# Patient Record
Sex: MALE | Race: WHITE | NOT HISPANIC OR LATINO | Employment: FULL TIME | URBAN - METROPOLITAN AREA
[De-identification: names, ages, dates, MRNs, and addresses within clinical notes are randomized per-mention and may not be internally consistent; named-entity substitution may affect disease eponyms.]

---

## 2023-07-13 ENCOUNTER — HOSPITAL ENCOUNTER (EMERGENCY)
Facility: CLINIC | Age: 43
Discharge: ANOTHER HOSPITAL | End: 2023-07-13
Attending: EMERGENCY MEDICINE | Admitting: EMERGENCY MEDICINE
Payer: COMMERCIAL

## 2023-07-13 ENCOUNTER — APPOINTMENT (OUTPATIENT)
Dept: CT IMAGING | Facility: CLINIC | Age: 43
End: 2023-07-13
Attending: EMERGENCY MEDICINE
Payer: COMMERCIAL

## 2023-07-13 VITALS
HEART RATE: 91 BPM | WEIGHT: 175 LBS | SYSTOLIC BLOOD PRESSURE: 99 MMHG | OXYGEN SATURATION: 93 % | TEMPERATURE: 99.7 F | DIASTOLIC BLOOD PRESSURE: 63 MMHG | RESPIRATION RATE: 20 BRPM

## 2023-07-13 DIAGNOSIS — K57.20 DIVERTICULITIS OF COLON WITH PERFORATION: ICD-10-CM

## 2023-07-13 LAB
ALBUMIN SERPL BCG-MCNC: 4 G/DL (ref 3.5–5.2)
ALP SERPL-CCNC: 54 U/L (ref 40–129)
ALT SERPL W P-5'-P-CCNC: 17 U/L (ref 0–70)
ANION GAP SERPL CALCULATED.3IONS-SCNC: 12 MMOL/L (ref 7–15)
AST SERPL W P-5'-P-CCNC: 18 U/L (ref 0–45)
BASOPHILS # BLD AUTO: 0 10E3/UL (ref 0–0.2)
BASOPHILS NFR BLD AUTO: 0 %
BILIRUB SERPL-MCNC: 0.4 MG/DL
BUN SERPL-MCNC: 18.6 MG/DL (ref 6–20)
CALCIUM SERPL-MCNC: 9.4 MG/DL (ref 8.6–10)
CHLORIDE SERPL-SCNC: 102 MMOL/L (ref 98–107)
CREAT SERPL-MCNC: 0.69 MG/DL (ref 0.67–1.17)
DEPRECATED HCO3 PLAS-SCNC: 24 MMOL/L (ref 22–29)
EOSINOPHIL # BLD AUTO: 0 10E3/UL (ref 0–0.7)
EOSINOPHIL NFR BLD AUTO: 0 %
ERYTHROCYTE [DISTWIDTH] IN BLOOD BY AUTOMATED COUNT: 11.9 % (ref 10–15)
GFR SERPL CREATININE-BSD FRML MDRD: >90 ML/MIN/1.73M2
GLUCOSE SERPL-MCNC: 125 MG/DL (ref 70–99)
HCT VFR BLD AUTO: 44.8 % (ref 40–53)
HGB BLD-MCNC: 15.4 G/DL (ref 13.3–17.7)
IMM GRANULOCYTES # BLD: 0 10E3/UL
IMM GRANULOCYTES NFR BLD: 0 %
LIPASE SERPL-CCNC: 17 U/L (ref 13–60)
LYMPHOCYTES # BLD AUTO: 0.2 10E3/UL (ref 0.8–5.3)
LYMPHOCYTES NFR BLD AUTO: 3 %
MCH RBC QN AUTO: 33.8 PG (ref 26.5–33)
MCHC RBC AUTO-ENTMCNC: 34.4 G/DL (ref 31.5–36.5)
MCV RBC AUTO: 99 FL (ref 78–100)
MONOCYTES # BLD AUTO: 0.3 10E3/UL (ref 0–1.3)
MONOCYTES NFR BLD AUTO: 4 %
NEUTROPHILS # BLD AUTO: 6.2 10E3/UL (ref 1.6–8.3)
NEUTROPHILS NFR BLD AUTO: 93 %
NRBC # BLD AUTO: 0 10E3/UL
NRBC BLD AUTO-RTO: 0 /100
PLATELET # BLD AUTO: 236 10E3/UL (ref 150–450)
POTASSIUM SERPL-SCNC: 4.5 MMOL/L (ref 3.4–5.3)
PROT SERPL-MCNC: 6.9 G/DL (ref 6.4–8.3)
RBC # BLD AUTO: 4.55 10E6/UL (ref 4.4–5.9)
SODIUM SERPL-SCNC: 138 MMOL/L (ref 136–145)
WBC # BLD AUTO: 6.8 10E3/UL (ref 4–11)

## 2023-07-13 PROCEDURE — 36415 COLL VENOUS BLD VENIPUNCTURE: CPT | Performed by: EMERGENCY MEDICINE

## 2023-07-13 PROCEDURE — 96375 TX/PRO/DX INJ NEW DRUG ADDON: CPT | Performed by: EMERGENCY MEDICINE

## 2023-07-13 PROCEDURE — 96365 THER/PROPH/DIAG IV INF INIT: CPT | Mod: 59 | Performed by: EMERGENCY MEDICINE

## 2023-07-13 PROCEDURE — 99285 EMERGENCY DEPT VISIT HI MDM: CPT | Mod: 25 | Performed by: EMERGENCY MEDICINE

## 2023-07-13 PROCEDURE — 96376 TX/PRO/DX INJ SAME DRUG ADON: CPT | Performed by: EMERGENCY MEDICINE

## 2023-07-13 PROCEDURE — 250N000011 HC RX IP 250 OP 636: Performed by: EMERGENCY MEDICINE

## 2023-07-13 PROCEDURE — 250N000009 HC RX 250: Performed by: EMERGENCY MEDICINE

## 2023-07-13 PROCEDURE — 96361 HYDRATE IV INFUSION ADD-ON: CPT | Performed by: EMERGENCY MEDICINE

## 2023-07-13 PROCEDURE — 74177 CT ABD & PELVIS W/CONTRAST: CPT

## 2023-07-13 PROCEDURE — 80053 COMPREHEN METABOLIC PANEL: CPT | Performed by: EMERGENCY MEDICINE

## 2023-07-13 PROCEDURE — 99285 EMERGENCY DEPT VISIT HI MDM: CPT | Performed by: EMERGENCY MEDICINE

## 2023-07-13 PROCEDURE — 83690 ASSAY OF LIPASE: CPT | Performed by: EMERGENCY MEDICINE

## 2023-07-13 PROCEDURE — 258N000003 HC RX IP 258 OP 636: Performed by: EMERGENCY MEDICINE

## 2023-07-13 PROCEDURE — 85014 HEMATOCRIT: CPT | Performed by: EMERGENCY MEDICINE

## 2023-07-13 RX ORDER — PIPERACILLIN SODIUM, TAZOBACTAM SODIUM 4; .5 G/20ML; G/20ML
4.5 INJECTION, POWDER, LYOPHILIZED, FOR SOLUTION INTRAVENOUS ONCE
Status: COMPLETED | OUTPATIENT
Start: 2023-07-13 | End: 2023-07-13

## 2023-07-13 RX ORDER — TESTOSTERONE ENANTHATE 200 MG/ML
INJECTION, SOLUTION INTRAMUSCULAR
COMMUNITY

## 2023-07-13 RX ORDER — MULTIVIT,TX WITH IRON,MINERALS
500 TABLET, EXTENDED RELEASE ORAL 2 TIMES DAILY
COMMUNITY

## 2023-07-13 RX ORDER — IOPAMIDOL 755 MG/ML
500 INJECTION, SOLUTION INTRAVASCULAR ONCE
Status: COMPLETED | OUTPATIENT
Start: 2023-07-13 | End: 2023-07-13

## 2023-07-13 RX ORDER — ASCORBIC ACID
5000 GRANULES (GRAM) ORAL DAILY
COMMUNITY

## 2023-07-13 RX ORDER — HYDROMORPHONE HYDROCHLORIDE 1 MG/ML
0.5 INJECTION, SOLUTION INTRAMUSCULAR; INTRAVENOUS; SUBCUTANEOUS ONCE
Status: COMPLETED | OUTPATIENT
Start: 2023-07-13 | End: 2023-07-13

## 2023-07-13 RX ORDER — PYRIDOXINE HCL (VITAMIN B6) 100 MG
250 TABLET ORAL DAILY
COMMUNITY

## 2023-07-13 RX ORDER — MULTIPLE VITAMINS W/ MINERALS TAB 9MG-400MCG
1 TAB ORAL DAILY
COMMUNITY

## 2023-07-13 RX ADMIN — HYDROMORPHONE HYDROCHLORIDE 0.5 MG: 1 INJECTION, SOLUTION INTRAMUSCULAR; INTRAVENOUS; SUBCUTANEOUS at 11:39

## 2023-07-13 RX ADMIN — IOPAMIDOL 85 ML: 755 INJECTION, SOLUTION INTRAVENOUS at 10:30

## 2023-07-13 RX ADMIN — HYDROMORPHONE HYDROCHLORIDE 0.5 MG: 1 INJECTION, SOLUTION INTRAMUSCULAR; INTRAVENOUS; SUBCUTANEOUS at 14:47

## 2023-07-13 RX ADMIN — SODIUM CHLORIDE 61 ML: 9 INJECTION, SOLUTION INTRAVENOUS at 10:30

## 2023-07-13 RX ADMIN — PIPERACILLIN AND TAZOBACTAM 4.5 G: 4; .5 INJECTION, POWDER, FOR SOLUTION INTRAVENOUS at 11:41

## 2023-07-13 RX ADMIN — SODIUM CHLORIDE 1000 ML: 9 INJECTION, SOLUTION INTRAVENOUS at 11:42

## 2023-07-13 ASSESSMENT — ACTIVITIES OF DAILY LIVING (ADL)
ADLS_ACUITY_SCORE: 35

## 2023-07-13 NOTE — CONSULTS
Patient seen in consultation for perforated diverticulitis    HPI:  Patient is a 43 year old male with 7 day history of LLQ abdominal pain now more generalized presented to the ED for evaluation. He is a  staying near the [facility] but lives in [place], [place]. His evaluation in the ED showed sigmoid colon inflammation with free intra-peritoneal air. He does not have an abscess. Of note, he is s/p chemo-radiation for rectal cancer completed ~7 months ago. He is currently undergoing surveillance at [facility] where all of his cancer care has been. He has never had abdominal surgery. Vital signs are WNL but his heart rate is upper normal and his temperature is upper normal.     Review Of Systems    Skin: negative  Ears/Nose/Throat: negative  Respiratory: No shortness of breath, dyspnea on exertion, cough, or hemoptysis  Cardiovascular: negative  Gastrointestinal: as above  Genitourinary: negative  Musculoskeletal: negative  Neurologic: negative  Hematologic/Lymphatic/Immunologic: negative  Endocrine: negative      Past Medical History:   Diagnosis Date     Rectal cancer (H)        No past surgical history on file.    No family history on file.    Social History     Socioeconomic History     Marital status:      Spouse name: Not on file     Number of children: Not on file     Years of education: Not on file     Highest education level: Not on file   Occupational History     Not on file   Tobacco Use     Smoking status: Not on file     Smokeless tobacco: Not on file   Substance and Sexual Activity     Alcohol use: Not on file     Drug use: Not on file     Sexual activity: Not on file   Other Topics Concern     Not on file   Social History Narrative     Not on file     Social Determinants of Health     Financial Resource Strain: Not on file   Food Insecurity: Not on file   Transportation Needs: Not on file   Physical Activity: Not on file   Stress: Not on file   Social Connections: Not on file    Intimate Partner Violence: Not on file   Housing Stability: Not on file       No current outpatient medications on file.       Medications and history reviewed    Physical exam:  Vitals: /81   Pulse 95   Temp 100.1  F (37.8  C) (Oral)   Resp 20   Wt 79.4 kg (175 lb)   SpO2 94%   BMI= There is no height or weight on file to calculate BMI.    Constitutional: alert, non-distressed   Head: Normo-cephalic, atraumatic, no lesions, masses or tenderness   Cardiovascular: RRR   Respiratory: CTAB   Gastrointestinal: Soft, no rigidity or guarding but significantly tender to palpation diffusely, small umbilical hernia noted  : Deferred  Musculoskeletal: Moves all extremities   Skin: No suspicious lesions or rashes   Psychiatric: Mentation appears normal, affect appropriate .    Labs show:  Results for orders placed or performed during the hospital encounter of 07/13/23 (from the past 24 hour(s))   CBC with platelets differential    Narrative    The following orders were created for panel order CBC with platelets differential.  Procedure                               Abnormality         Status                     ---------                               -----------         ------                     CBC with platelets and d...[[id number]]  Abnormal            Final result                 Please view results for these tests on the individual orders.   Comprehensive metabolic panel   Result Value Ref Range    Sodium 138 136 - 145 mmol/L    Potassium 4.5 3.4 - 5.3 mmol/L    Chloride 102 98 - 107 mmol/L    Carbon Dioxide (CO2) 24 22 - 29 mmol/L    Anion Gap 12 7 - 15 mmol/L    Urea Nitrogen 18.6 6.0 - 20.0 mg/dL    Creatinine 0.69 0.67 - 1.17 mg/dL    Calcium 9.4 8.6 - 10.0 mg/dL    Glucose 125 (H) 70 - 99 mg/dL    Alkaline Phosphatase 54 40 - 129 U/L    AST 18 0 - 45 U/L    ALT 17 0 - 70 U/L    Protein Total 6.9 6.4 - 8.3 g/dL    Albumin 4.0 3.5 - 5.2 g/dL    Bilirubin Total 0.4 <=1.2 mg/dL    GFR Estimate >90 >60  mL/min/1.73m2   Lipase   Result Value Ref Range    Lipase 17 13 - 60 U/L   CBC with platelets and differential   Result Value Ref Range    WBC Count 6.8 4.0 - 11.0 10e3/uL    RBC Count 4.55 4.40 - 5.90 10e6/uL    Hemoglobin 15.4 13.3 - 17.7 g/dL    Hematocrit 44.8 40.0 - 53.0 %    MCV 99 78 - 100 fL    MCH 33.8 (H) 26.5 - 33.0 pg    MCHC 34.4 31.5 - 36.5 g/dL    RDW 11.9 10.0 - 15.0 %    Platelet Count 236 150 - 450 10e3/uL    % Neutrophils 93 %    % Lymphocytes 3 %    % Monocytes 4 %    % Eosinophils 0 %    % Basophils 0 %    % Immature Granulocytes 0 %    NRBCs per 100 WBC 0 <1 /100    Absolute Neutrophils 6.2 1.6 - 8.3 10e3/uL    Absolute Lymphocytes 0.2 (L) 0.8 - 5.3 10e3/uL    Absolute Monocytes 0.3 0.0 - 1.3 10e3/uL    Absolute Eosinophils 0.0 0.0 - 0.7 10e3/uL    Absolute Basophils 0.0 0.0 - 0.2 10e3/uL    Absolute Immature Granulocytes 0.0 <=0.4 10e3/uL    Absolute NRBCs 0.0 10e3/uL   CT Abdomen Pelvis w Contrast    Narrative    CT ABDOMEN AND PELVIS WITH CONTRAST  7/13/2023 10:42 AM    HISTORY: Left lower quadrant pain, right upper quadrant pain, guarding  of seven days duration.    TECHNIQUE: CT scan obtained of the abdomen, and pelvis with IV  contrast. ISOVUE-370, 85 mL IV injected. Radiation dose for this scan  was reduced using automated exposure control, adjustment of the mA  and/or kV according to patient size, or iterative reconstruction  technique.    COMPARISON: None available    FINDINGS:    Lower chest: Basilar pulmonary opacities, likely atelectasis.    Abdomen/pelvis:    Hepatobiliary: No suspicious focal hepatic lesion. The gallbladder is  unremarkable.    Pancreas: No main pancreatic ductal dilatation or definite solid  pancreatic mass.    Spleen: No splenomegaly.    Adrenal glands: No adrenal nodules.    Kidneys: No radiodense kidney/ureteral stones or hydronephrosis in the  kidney.    Bowel: No abnormally dilated small bowel loops. The appendix is  visualized and appears normal. Colonic  diverticulosis and colonic wall  thickening of the sigmoid colon, findings worrisome for acute  diverticulitis or short segment colitis.    Peritoneum: Foci of free peritoneal air, worrisome for colonic  perforation. Trace amount of free fluid in the pelvis, indeterminate,  could be reactive.    Pelvic organs: Diffuse urinary bladder wall thickening, nonspecific,  can be seen with chronic bladder outlet obstruction or chronic  cystitis.    Vascular: Moderate atherosclerotic vascular calcification of the  abdominal aorta and iliac vessels.    Lymph nodes: No significant abdominopelvic lymphadenopathy.    Bones and soft tissue: No suspicious osseous lesion.      Impression    IMPRESSION:   1. Colonic diverticulosis and colonic wall thickening of the sigmoid  colon, findings worrisome for acute diverticulitis or short segment  colitis. Foci of free peritoneal air, worrisome for colonic  perforation.    PATRICIA KRAMER MD         SYSTEM ID:  [id number]       Imaging shows:  Recent Results (from the past 744 hour(s))   CT Abdomen Pelvis w Contrast    Narrative    CT ABDOMEN AND PELVIS WITH CONTRAST  7/13/2023 10:42 AM    HISTORY: Left lower quadrant pain, right upper quadrant pain, guarding  of seven days duration.    TECHNIQUE: CT scan obtained of the abdomen, and pelvis with IV  contrast. ISOVUE-370, 85 mL IV injected. Radiation dose for this scan  was reduced using automated exposure control, adjustment of the mA  and/or kV according to patient size, or iterative reconstruction  technique.    COMPARISON: None available    FINDINGS:    Lower chest: Basilar pulmonary opacities, likely atelectasis.    Abdomen/pelvis:    Hepatobiliary: No suspicious focal hepatic lesion. The gallbladder is  unremarkable.    Pancreas: No main pancreatic ductal dilatation or definite solid  pancreatic mass.    Spleen: No splenomegaly.    Adrenal glands: No adrenal nodules.    Kidneys: No radiodense kidney/ureteral stones or hydronephrosis  in the  kidney.    Bowel: No abnormally dilated small bowel loops. The appendix is  visualized and appears normal. Colonic diverticulosis and colonic wall  thickening of the sigmoid colon, findings worrisome for acute  diverticulitis or short segment colitis.    Peritoneum: Foci of free peritoneal air, worrisome for colonic  perforation. Trace amount of free fluid in the pelvis, indeterminate,  could be reactive.    Pelvic organs: Diffuse urinary bladder wall thickening, nonspecific,  can be seen with chronic bladder outlet obstruction or chronic  cystitis.    Vascular: Moderate atherosclerotic vascular calcification of the  abdominal aorta and iliac vessels.    Lymph nodes: No significant abdominopelvic lymphadenopathy.    Bones and soft tissue: No suspicious osseous lesion.      Impression    IMPRESSION:   1. Colonic diverticulosis and colonic wall thickening of the sigmoid  colon, findings worrisome for acute diverticulitis or short segment  colitis. Foci of free peritoneal air, worrisome for colonic  perforation.    PATRICIA KRAMER MD         SYSTEM ID:  [id number]        Assessment:     ICD-10-CM    1. Diverticulitis of colon with perforation  K57.20         Plan: Not septic or peritoneal at this time but given personal history of cancer and immunocompromised state this can complicate the picture. Recommend close observation with NPO, IVF resuscitation, IV ABX and symptom control. We discussed need for emergency surgery if he clinically declines with often times leads to temporary colostomy creation. He is very concerned about this and wishes to avoid this if at all possible. He is interested in pursuing transfer to higher level of care with colorectal surgery availability given his personal history of recent rectal cancer. We will call [facility] and [facility] to see if there is any bed availability.    Brooks Tyler, DO

## 2023-07-13 NOTE — ED PROVIDER NOTES
History     chief complaint  HPI  Patient is a 43-year-old gentleman with a history of rectal cancer status post radiation who is presenting with 7 days of worsening left lower quadrant pain.  This morning he had a sudden onset of worsening of pain and it spread to the rest of his abdomen.  Had an episode of nausea this morning.  No vomiting.  Initially had some diarrhea but it turned into constipation and he has not had a bowel movement in several days.  No rhinorrhea, sore throat, cough, fevers, chills, chest pain, dyspnea.    Review of Systems:  All organ systems below were reviewed and are negative unless indicated in the HPI.    Constitutional  Eyes  ENT  Respiratory  Cardiovascular  Gastrointestinal  Genitourinary  Musculoskeletal  Skin  Neuro    Allergies:  No Known Allergies    Problem List:    Patient Active Problem List    Diagnosis Date Noted     Pain in joint, shoulder region 07/01/2008     Priority: Medium        Past Medical History:    Past Medical History:   Diagnosis Date     Rectal cancer (H)        Past Surgical History:    No past surgical history on file.    Family History:    No family history on file.    Medications:    Ascorbic Acid POWD  Cholecalciferol LIQD  magnesium gluconate (MAGONATE) 250 MG tablet  multivitamin w/minerals (THERA-VIT-M) tablet  Selenium 100 MCG TABS  testosterone enanthate (DELATESTRYL) 200 MG/ML injection          Physical Exam   BP: 112/81  Pulse: 95  Temp: 100.1  F (37.8  C)  Resp: 20  Weight: 79.4 kg (175 lb)  SpO2: 93 %    Gen: Vital signs reviewed  Eyes: Sclera white, pupils round  ENT: External ears and nares normal  Card: Regular rate and rhythm  Resp: No respiratory distress. Lungs clear to auscultation bilaterally  Abd: Soft, non-distended, tender to palpation with mild guarding left lower quadrant and less so elsewhere but still present.  Extremities: Symmetric distal pulses.  Skin: No rashes  Neuro: Alert, speech normal.     ED Course         Procedures                Results for orders placed or performed during the hospital encounter of 07/13/23 (from the past 24 hour(s))   CBC with platelets differential    Narrative    The following orders were created for panel order CBC with platelets differential.  Procedure                               Abnormality         Status                     ---------                               -----------         ------                     CBC with platelets and d...[[id number]]  Abnormal            Final result                 Please view results for these tests on the individual orders.   Comprehensive metabolic panel   Result Value Ref Range    Sodium 138 136 - 145 mmol/L    Potassium 4.5 3.4 - 5.3 mmol/L    Chloride 102 98 - 107 mmol/L    Carbon Dioxide (CO2) 24 22 - 29 mmol/L    Anion Gap 12 7 - 15 mmol/L    Urea Nitrogen 18.6 6.0 - 20.0 mg/dL    Creatinine 0.69 0.67 - 1.17 mg/dL    Calcium 9.4 8.6 - 10.0 mg/dL    Glucose 125 (H) 70 - 99 mg/dL    Alkaline Phosphatase 54 40 - 129 U/L    AST 18 0 - 45 U/L    ALT 17 0 - 70 U/L    Protein Total 6.9 6.4 - 8.3 g/dL    Albumin 4.0 3.5 - 5.2 g/dL    Bilirubin Total 0.4 <=1.2 mg/dL    GFR Estimate >90 >60 mL/min/1.73m2   Lipase   Result Value Ref Range    Lipase 17 13 - 60 U/L   CBC with platelets and differential   Result Value Ref Range    WBC Count 6.8 4.0 - 11.0 10e3/uL    RBC Count 4.55 4.40 - 5.90 10e6/uL    Hemoglobin 15.4 13.3 - 17.7 g/dL    Hematocrit 44.8 40.0 - 53.0 %    MCV 99 78 - 100 fL    MCH 33.8 (H) 26.5 - 33.0 pg    MCHC 34.4 31.5 - 36.5 g/dL    RDW 11.9 10.0 - 15.0 %    Platelet Count 236 150 - 450 10e3/uL    % Neutrophils 93 %    % Lymphocytes 3 %    % Monocytes 4 %    % Eosinophils 0 %    % Basophils 0 %    % Immature Granulocytes 0 %    NRBCs per 100 WBC 0 <1 /100    Absolute Neutrophils 6.2 1.6 - 8.3 10e3/uL    Absolute Lymphocytes 0.2 (L) 0.8 - 5.3 10e3/uL    Absolute Monocytes 0.3 0.0 - 1.3 10e3/uL    Absolute Eosinophils 0.0 0.0 - 0.7 10e3/uL     Absolute Basophils 0.0 0.0 - 0.2 10e3/uL    Absolute Immature Granulocytes 0.0 <=0.4 10e3/uL    Absolute NRBCs 0.0 10e3/uL   CT Abdomen Pelvis w Contrast    Narrative    CT ABDOMEN AND PELVIS WITH CONTRAST  7/13/2023 10:42 AM    HISTORY: Left lower quadrant pain, right upper quadrant pain, guarding  of seven days duration.    TECHNIQUE: CT scan obtained of the abdomen, and pelvis with IV  contrast. ISOVUE-370, 85 mL IV injected. Radiation dose for this scan  was reduced using automated exposure control, adjustment of the mA  and/or kV according to patient size, or iterative reconstruction  technique.    COMPARISON: None available    FINDINGS:    Lower chest: Basilar pulmonary opacities, likely atelectasis.    Abdomen/pelvis:    Hepatobiliary: No suspicious focal hepatic lesion. The gallbladder is  unremarkable.    Pancreas: No main pancreatic ductal dilatation or definite solid  pancreatic mass.    Spleen: No splenomegaly.    Adrenal glands: No adrenal nodules.    Kidneys: No radiodense kidney/ureteral stones or hydronephrosis in the  kidney.    Bowel: No abnormally dilated small bowel loops. The appendix is  visualized and appears normal. Colonic diverticulosis and colonic wall  thickening of the sigmoid colon, findings worrisome for acute  diverticulitis or short segment colitis.    Peritoneum: Foci of free peritoneal air, worrisome for colonic  perforation. Trace amount of free fluid in the pelvis, indeterminate,  could be reactive.    Pelvic organs: Diffuse urinary bladder wall thickening, nonspecific,  can be seen with chronic bladder outlet obstruction or chronic  cystitis.    Vascular: Moderate atherosclerotic vascular calcification of the  abdominal aorta and iliac vessels.    Lymph nodes: No significant abdominopelvic lymphadenopathy.    Bones and soft tissue: No suspicious osseous lesion.      Impression    IMPRESSION:   1. Colonic diverticulosis and colonic wall thickening of the sigmoid  colon, findings  worrisome for acute diverticulitis or short segment  colitis. Foci of free peritoneal air, worrisome for colonic  perforation.    PATRICIA KRAMER MD         SYSTEM ID:  [id number]       Medications   iopamidol (ISOVUE-370) solution 500 mL (85 mLs Intravenous $Given 7/13/23 1030)   sodium chloride 0.9 % bag 100mL for CT scan flush use (61 mLs Intravenous $Given 7/13/23 1030)   piperacillin-tazobactam (ZOSYN) 4.5 g vial to attach to  mL bag (0 g Intravenous Stopped 7/13/23 1213)   0.9% sodium chloride BOLUS (0 mLs Intravenous Stopped 7/13/23 1332)   HYDROmorphone (PF) (DILAUDID) injection 0.5 mg (0.5 mg Intravenous $Given 7/13/23 1139)         Consultations:  Dr. 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    I have reviewed the findings, diagnosis, plan and need for follow up with the patient.      Medical Decision Making  On arrival patient is overall well-appearing.  Heart rate is upper limits of normal and his temperature is 100.1.  Differential of his presentation includes diverticulitis, appendicitis, cholecystitis, colitis.  Laboratory work-up ordered along with CT scan.  At this point patient did not want pain medications or nausea medications.      Labs are reassuring, but CT demonstrates findings consistent with acute sigmoid diverticulitis with perforation with intra-abdominal free air.  He remained mildly tachycardic.  I did have him seen by our general surgeon, who evaluated him at the bedside.  Based on his rectal cancer history, our surgeon recommended transfer to [facility].  I discussed case with Dr. Jakub Terry of colorectal surgery at [facility] who accepted the patient in transfer.  I did give patient a dose of Zosyn.  Remained hemodynamically stable here.  Transferred in stable condition.    Final diagnoses:    Diverticulitis of colon with perforation         Mario Waterman M.D.   [facility] Emergency Department     Mario Waterman MD  07/13/23 [id number]

## 2023-07-13 NOTE — ED TRIAGE NOTES
RUQ pain started as LLQ pain 6 days ago. Also reports shoulder pain.     Triage Assessment     Row Name 07/13/23 1013       Triage Assessment (Adult)    Airway WDL WDL       Respiratory WDL    Respiratory WDL WDL